# Patient Record
Sex: FEMALE | Race: WHITE | Employment: STUDENT
[De-identification: names, ages, dates, MRNs, and addresses within clinical notes are randomized per-mention and may not be internally consistent; named-entity substitution may affect disease eponyms.]

---

## 2017-06-03 ENCOUNTER — HEALTH MAINTENANCE LETTER (OUTPATIENT)
Age: 22
End: 2017-06-03

## 2021-10-05 ENCOUNTER — LAB (OUTPATIENT)
Dept: LAB | Facility: CLINIC | Age: 26
End: 2021-10-05
Attending: INTERNAL MEDICINE

## 2021-10-05 DIAGNOSIS — Z52.001 STEM CELL DONOR: Primary | ICD-10-CM

## 2021-10-05 DIAGNOSIS — Z52.001 STEM CELL DONOR: ICD-10-CM

## 2021-10-05 PROCEDURE — 81378 HLA I & II TYPING HR: CPT

## 2021-10-05 PROCEDURE — 36415 COLL VENOUS BLD VENIPUNCTURE: CPT

## 2021-10-05 PROCEDURE — 0001U RBC DNA HEA 35 AG 11 BLD GRP: CPT

## 2021-10-05 NOTE — NURSING NOTE
I drew labs on this pt while in clinic. They tolerated this well.   Labs sent down to the first floor labs.    Maria M Galarza MA

## 2021-10-08 ENCOUNTER — LAB (OUTPATIENT)
Dept: LAB | Facility: CLINIC | Age: 26
End: 2021-10-08
Attending: INTERNAL MEDICINE

## 2021-10-08 DIAGNOSIS — Z52.001 STEM CELL DONOR: ICD-10-CM

## 2021-10-08 DIAGNOSIS — Z52.001 STEM CELL DONOR: Primary | ICD-10-CM

## 2021-10-08 LAB
HBV SURFACE AB SERPL IA-ACNC: 0.42 M[IU]/ML
LAB DIRECTOR COMMENTS: NORMAL
LAB DIRECTOR DISCLAIMER: NORMAL
LAB DIRECTOR INTERPRETATION: NORMAL
LAB DIRECTOR METHODOLOGY: NORMAL
LAB DIRECTOR RESULTS: NORMAL
SPECIMEN DESCRIPTION: NORMAL
T PALLIDUM AB SER QL: NONREACTIVE

## 2021-10-08 PROCEDURE — 86706 HEP B SURFACE ANTIBODY: CPT

## 2021-10-08 PROCEDURE — 86780 TREPONEMA PALLIDUM: CPT

## 2021-10-08 PROCEDURE — 86644 CMV ANTIBODY: CPT

## 2021-10-08 PROCEDURE — 36415 COLL VENOUS BLD VENIPUNCTURE: CPT

## 2021-10-08 PROCEDURE — 86803 HEPATITIS C AB TEST: CPT

## 2021-10-08 PROCEDURE — 86665 EPSTEIN-BARR CAPSID VCA: CPT

## 2021-10-08 NOTE — NURSING NOTE
Chief Complaint   Patient presents with     Lab Only     labs drawn with  by rn.     Labs drawn with vpt by rn.  Pt tolerated well.     Caren Lagunas RN

## 2021-10-11 LAB
CMV IGG SERPL IA-ACNC: >10 U/ML
CMV IGG SERPL IA-ACNC: ABNORMAL
DONOR CYTOMEGALOVIRUS ABY: POSITIVE
DONOR HEP B CORE ABY: ABNORMAL
DONOR HEP B SURF AGN: ABNORMAL
DONOR HEPATITIS C ABY: ABNORMAL
DONOR HTLV 1&2 ANTIBODY: ABNORMAL
DONOR TREPONEMA PAL ABY: ABNORMAL
EBV VCA IGG SER IA-ACNC: <10 U/ML
EBV VCA IGG SER IA-ACNC: NORMAL
HIV1+2 AB SERPL QL IA: ABNORMAL
TRYPANOSOMA CRUZI: ABNORMAL

## 2021-10-13 DIAGNOSIS — Z86.2 PERSONAL HISTORY OF DISEASES OF BLOOD AND BLOOD-FORMING ORGANS: ICD-10-CM

## 2021-10-13 DIAGNOSIS — Z52.001 STEM CELL DONOR: ICD-10-CM

## 2021-10-14 ENCOUNTER — ONCOLOGY VISIT (OUTPATIENT)
Dept: TRANSPLANT | Facility: CLINIC | Age: 26
End: 2021-10-14
Attending: INTERNAL MEDICINE

## 2021-10-14 ENCOUNTER — HOSPITAL ENCOUNTER (OUTPATIENT)
Dept: LAB | Facility: CLINIC | Age: 26
End: 2021-10-14
Attending: INTERNAL MEDICINE

## 2021-10-14 ENCOUNTER — VIRTUAL VISIT (OUTPATIENT)
Dept: TRANSPLANT | Facility: CLINIC | Age: 26
End: 2021-10-14
Attending: INTERNAL MEDICINE

## 2021-10-14 ENCOUNTER — MEDICAL CORRESPONDENCE (OUTPATIENT)
Dept: TRANSPLANT | Facility: CLINIC | Age: 26
End: 2021-10-14

## 2021-10-14 ENCOUNTER — APPOINTMENT (OUTPATIENT)
Dept: LAB | Facility: CLINIC | Age: 26
End: 2021-10-14
Attending: INTERNAL MEDICINE

## 2021-10-14 ENCOUNTER — ANCILLARY PROCEDURE (OUTPATIENT)
Dept: GENERAL RADIOLOGY | Facility: CLINIC | Age: 26
End: 2021-10-14
Attending: INTERNAL MEDICINE

## 2021-10-14 ENCOUNTER — ALLIED HEALTH/NURSE VISIT (OUTPATIENT)
Dept: TRANSPLANT | Facility: CLINIC | Age: 26
End: 2021-10-14
Attending: INTERNAL MEDICINE

## 2021-10-14 VITALS
TEMPERATURE: 98.3 F | BODY MASS INDEX: 26.41 KG/M2 | DIASTOLIC BLOOD PRESSURE: 82 MMHG | WEIGHT: 154.7 LBS | OXYGEN SATURATION: 98 % | RESPIRATION RATE: 16 BRPM | HEART RATE: 81 BPM | SYSTOLIC BLOOD PRESSURE: 121 MMHG | HEIGHT: 64 IN

## 2021-10-14 VITALS
RESPIRATION RATE: 16 BRPM | TEMPERATURE: 98.8 F | DIASTOLIC BLOOD PRESSURE: 69 MMHG | HEART RATE: 86 BPM | HEIGHT: 64 IN | BODY MASS INDEX: 26.41 KG/M2 | WEIGHT: 154.7 LBS | SYSTOLIC BLOOD PRESSURE: 122 MMHG

## 2021-10-14 DIAGNOSIS — Z52.001 STEM CELL DONOR: ICD-10-CM

## 2021-10-14 DIAGNOSIS — Z86.2 PERSONAL HISTORY OF DISEASES OF BLOOD AND BLOOD-FORMING ORGANS: ICD-10-CM

## 2021-10-14 DIAGNOSIS — Z86.2 PERSONAL HISTORY OF DISEASES OF BLOOD AND BLOOD-FORMING ORGANS: Primary | ICD-10-CM

## 2021-10-14 DIAGNOSIS — Z52.001 STEM CELL DONOR: Primary | ICD-10-CM

## 2021-10-14 LAB
ABO/RH(D): NORMAL
ALBUMIN SERPL-MCNC: 4.3 G/DL (ref 3.4–5)
ALBUMIN UR-MCNC: NEGATIVE MG/DL
ALP SERPL-CCNC: 55 U/L (ref 40–150)
ALT SERPL W P-5'-P-CCNC: 19 U/L (ref 0–50)
ANION GAP SERPL CALCULATED.3IONS-SCNC: 6 MMOL/L (ref 3–14)
ANTIBODY SCREEN: NEGATIVE
APPEARANCE UR: ABNORMAL
APTT PPP: 32 SECONDS (ref 22–38)
AST SERPL W P-5'-P-CCNC: 13 U/L (ref 0–45)
BACTERIA #/AREA URNS HPF: ABNORMAL /HPF
BASOPHILS # BLD AUTO: 0 10E3/UL (ref 0–0.2)
BASOPHILS NFR BLD AUTO: 1 %
BILIRUB SERPL-MCNC: 1 MG/DL (ref 0.2–1.3)
BILIRUB UR QL STRIP: NEGATIVE
BUN SERPL-MCNC: 8 MG/DL (ref 7–30)
CALCIUM SERPL-MCNC: 9.5 MG/DL (ref 8.5–10.1)
CHLORIDE BLD-SCNC: 105 MMOL/L (ref 94–109)
CO2 SERPL-SCNC: 27 MMOL/L (ref 20–32)
COLOR UR AUTO: YELLOW
CREAT SERPL-MCNC: 0.92 MG/DL (ref 0.52–1.04)
EOSINOPHIL # BLD AUTO: 0.1 10E3/UL (ref 0–0.7)
EOSINOPHIL NFR BLD AUTO: 1 %
ERYTHROCYTE [DISTWIDTH] IN BLOOD BY AUTOMATED COUNT: 11.7 % (ref 10–15)
GFR SERPL CREATININE-BSD FRML MDRD: 86 ML/MIN/1.73M2
GLUCOSE BLD-MCNC: 101 MG/DL (ref 70–99)
GLUCOSE UR STRIP-MCNC: NEGATIVE MG/DL
HCG SERPL QL: NEGATIVE
HCT VFR BLD AUTO: 44 % (ref 35–47)
HGB BLD-MCNC: 15 G/DL (ref 11.7–15.7)
HGB UR QL STRIP: NEGATIVE
IMM GRANULOCYTES # BLD: 0 10E3/UL
IMM GRANULOCYTES NFR BLD: 0 %
INR PPP: 1.06 (ref 0.85–1.15)
KETONES UR STRIP-MCNC: NEGATIVE MG/DL
LEUKOCYTE ESTERASE UR QL STRIP: ABNORMAL
LYMPHOCYTES # BLD AUTO: 1.1 10E3/UL (ref 0.8–5.3)
LYMPHOCYTES NFR BLD AUTO: 13 %
MCH RBC QN AUTO: 29.5 PG (ref 26.5–33)
MCHC RBC AUTO-ENTMCNC: 34.1 G/DL (ref 31.5–36.5)
MCV RBC AUTO: 87 FL (ref 78–100)
MONOCYTES # BLD AUTO: 0.4 10E3/UL (ref 0–1.3)
MONOCYTES NFR BLD AUTO: 5 %
NEUTROPHILS # BLD AUTO: 6.7 10E3/UL (ref 1.6–8.3)
NEUTROPHILS NFR BLD AUTO: 80 %
NITRATE UR QL: NEGATIVE
NRBC # BLD AUTO: 0 10E3/UL
NRBC BLD AUTO-RTO: 0 /100
PH UR STRIP: 6 [PH] (ref 5–7)
PLATELET # BLD AUTO: 309 10E3/UL (ref 150–450)
POTASSIUM BLD-SCNC: 3.7 MMOL/L (ref 3.4–5.3)
PROT SERPL-MCNC: 7.8 G/DL (ref 6.8–8.8)
RBC # BLD AUTO: 5.08 10E6/UL (ref 3.8–5.2)
RBC URINE: 4 /HPF
SODIUM SERPL-SCNC: 138 MMOL/L (ref 133–144)
SP GR UR STRIP: 1 (ref 1–1.03)
SPECIMEN EXPIRATION DATE: NORMAL
SQUAMOUS EPITHELIAL: 6 /HPF
UROBILINOGEN UR STRIP-MCNC: NORMAL MG/DL
WBC # BLD AUTO: 8.3 10E3/UL (ref 4–11)
WBC URINE: 5 /HPF

## 2021-10-14 PROCEDURE — 99204 OFFICE O/P NEW MOD 45 MIN: CPT

## 2021-10-14 PROCEDURE — 85025 COMPLETE CBC W/AUTO DIFF WBC: CPT

## 2021-10-14 PROCEDURE — 36415 COLL VENOUS BLD VENIPUNCTURE: CPT

## 2021-10-14 PROCEDURE — 87086 URINE CULTURE/COLONY COUNT: CPT

## 2021-10-14 PROCEDURE — 82374 ASSAY BLOOD CARBON DIOXIDE: CPT

## 2021-10-14 PROCEDURE — 81001 URINALYSIS AUTO W/SCOPE: CPT

## 2021-10-14 PROCEDURE — 82040 ASSAY OF SERUM ALBUMIN: CPT

## 2021-10-14 PROCEDURE — 81382 HLA II TYPING 1 LOC HR: CPT

## 2021-10-14 PROCEDURE — 86900 BLOOD TYPING SEROLOGIC ABO: CPT

## 2021-10-14 PROCEDURE — 85610 PROTHROMBIN TIME: CPT

## 2021-10-14 PROCEDURE — 85730 THROMBOPLASTIN TIME PARTIAL: CPT

## 2021-10-14 PROCEDURE — G0463 HOSPITAL OUTPT CLINIC VISIT: HCPCS

## 2021-10-14 PROCEDURE — 81372 HLA I TYPING COMPLETE LR: CPT

## 2021-10-14 PROCEDURE — G0463 HOSPITAL OUTPT CLINIC VISIT: HCPCS | Mod: 25

## 2021-10-14 PROCEDURE — U0005 INFEC AGEN DETEC AMPLI PROBE: HCPCS

## 2021-10-14 PROCEDURE — 83021 HEMOGLOBIN CHROMOTOGRAPHY: CPT

## 2021-10-14 PROCEDURE — 81375 HLA II TYPING AG EQUIV LR: CPT

## 2021-10-14 PROCEDURE — 71046 X-RAY EXAM CHEST 2 VIEWS: CPT | Mod: GC | Performed by: STUDENT IN AN ORGANIZED HEALTH CARE EDUCATION/TRAINING PROGRAM

## 2021-10-14 PROCEDURE — 84703 CHORIONIC GONADOTROPIN ASSAY: CPT

## 2021-10-14 PROCEDURE — 93010 ELECTROCARDIOGRAM REPORT: CPT | Performed by: INTERNAL MEDICINE

## 2021-10-14 RX ORDER — ALBUTEROL SULFATE 90 UG/1
1 AEROSOL, METERED RESPIRATORY (INHALATION) DAILY PRN
COMMUNITY

## 2021-10-14 ASSESSMENT — MIFFLIN-ST. JEOR
SCORE: 1432.58
SCORE: 1432.58

## 2021-10-14 ASSESSMENT — PAIN SCALES - GENERAL: PAINLEVEL: NO PAIN (0)

## 2021-10-14 NOTE — LETTER
10/14/2021         RE: Roseline Wallace  [address]  [facility] [address]        Dear Colleague,    Thank you for referring your patient, Roseline Wallace, to the [facility] BLOOD AND MARROW TRANSPLANT PROGRAM [place]. Please see a copy of my visit note below.    Discussed GCSF and apheresis procedure for HSCT donation. Provided written handouts and reviewed donor consent, apheresis informational sheet, collection calender and side effects associated with GCSF and apheresis. Patient verbalized understanding. All questions were answered.         Again, thank you for allowing me to participate in the care of your patient.        Sincerely,        BMT Nurse Coordinator

## 2021-10-14 NOTE — PROGRESS NOTES
BMT Teaching Flowsheet    Roseline Wallace is a 26 year old female  Diagnoses of Stem cell donor and Personal history of diseases of blood and blood-forming organs were pertinent to this visit.    Teaching Topic: RD BMT workup     Person(s) involved in teaching: Patient  Motivation Level  Asks Questions: Yes  Eager to Learn: Yes  Cooperative: Yes  Receptive (willing/able to accept information): Yes  Any cultural factors/Evangelical beliefs that may influence understanding or compliance? No    Patient demonstrates understanding of the following:  - Reason for the appointment, diagnosis and treatment plan: Yes  - Knowledge of proper use of medications and conditions for which they are ordered (with special attention to potential side effects or drug interactions): Yes  - Which situations necessitate calling provider and whom to contact: Yes      Instructional Materials Used/Given:   BMT Calendar, BMT workup folder, consents    Donor arrived to clinic by herself. Vitals, Wt, and Ht obtained. Allergies and meds reviewed. BMT Calendar and workup folder given to patient and reviewed.  All questions answered. Consents signed. Labs drawn via venipuncture.  Covid swab and UA obtained. Patient left in stable condition for MA to do EKG.     Time spent with patient: 30 minutes.    Bell Contreras RN

## 2021-10-14 NOTE — NURSING NOTE
Chief Complaint   Patient presents with     RECHECK     H/P with Provider for RD BMT Workup     H/P with JUAN ANTONIO.    Bell Boles RN

## 2021-10-14 NOTE — NURSING NOTE
EKG was performed today per order written by Sophia.  Name and  verified with patient.    Genny Madden LPN

## 2021-10-14 NOTE — PROGRESS NOTES
Discussed GCSF and apheresis procedure for HSCT donation. Provided written handouts and reviewed donor consent, apheresis informational sheet, collection calender and side effects associated with GCSF and apheresis. Patient verbalized understanding. All questions were answered.

## 2021-10-14 NOTE — CONSULTS
"APHERESIS INITIAL CONSULT CHECKLIST    Current Encounter Information  Current Encounter Information: Reason for Visit, Allergies and Current Meds  Procedure Requested: MNC/PBSC Collection  History of: (Reason for Apheresis): Allo Donor    Access Assessment  Access Assessment  Vein Assessment:  Veins are adequate: Yes (LD/RB)  Needs a catheter placed for Apheresis?: No    Vital Signs  Vital Signs  BP: 122/69  Pulse: 86  Temp: 98.8  F (37.1  C)  Temp src: Oral  Resp: 16  Height: 163.5 cm (5' 4.37\")  Weight: 70.2 kg (154 lb 11.2 oz)    Reviewed   Review With Patient  Have you read the brochure Getting ready for Apheresis?: Yes  Have you had any invasive procedures, surgery, biopsy, bleeding in the last month?: No  Review medications and allergies: Yes (NKA)  Have you ever been transfused?: No  Patient given tour of the unit: No    Additional Information  Notes, needs and time spent with patient  Explain procedure, side effects or reactions, instructions: Yes  Patient has special need?: No  Time spent: 30 mins face to face time spent with donor/completed donor medical history & assessment. Discussed procedure & expectations. Hydration and low fat diet before and during the collection recommended.        "

## 2021-10-14 NOTE — CONSULTS
Transfusion Medicine Consultation    Roseline Wallace [id number]   YOB: 1995 Age: 26 year old   Date of Consult: 10/14/2021     Reason for consult: Allogeneic Hematopoietic Progenitor Cell (HPC)  Collection           Assessment and Plan:   26 year old female presents for consultation for allogeneic HPC collection for her sister with Hodgkin's Lymphoma.  The plan is to collect for 1 to 3 days or until the target goal is met. The patient does have adequate veins and will not require line placement. The procedure, risks/benefits were discussed with the patient and all of her questions were addressed at this time.         Chief Complaint:   Transfusion medicine consultation.         History of Present Illness:   26 year old female presents for consultation for allogeneic  HPC  collection.  Her past medical history includes spontaneous pneumothorax and asthma.  She is currently well. The patient denies any back pain that would prevent her from tolerating the procedure. She traveled to [place] in 2018 but no other travel to endemic [place] within the past year. The patient has no identifiable risk factors for infectious disease.               Past Medical History:     Spontaneous pneumothorax when she was 17   Asthma           Past Surgical History:     Lung surgery after her spontaneous pneumothorax            Social History:     Social History     Tobacco Use     Smoking status: Never Smoker     Smokeless tobacco: Never Used   Substance Use Topics     Alcohol use: Yes     Alcohol/week: 3.0 standard drinks     Types: 3 Glasses of wine per week             Family History:     Sister with Hodgkin's lymphoma           Immunizations:     Immunization History   Administered Date(s) Administered     COVID-19,PF,Moderna 04/12/2021, 05/10/2021          Allergies:     Seasonal allergies   Identical twin sister allergic to amoxicillin           Medications:     Current Outpatient Medications   Medication Sig      "albuterol (PROAIR HFA/PROVENTIL HFA/VENTOLIN HFA) 108 (90 Base) MCG/ACT inhaler Inhale 1 puff into the lungs daily as needed for shortness of breath / dyspnea or wheezing     No current facility-administered medications for this encounter.             Review of Systems:     CONSTITUTIONAL: NEGATIVE for fever, chills, change in weight  ENT/MOUTH: NEGATIVE for ear, mouth and throat problems  RESP: NEGATIVE for significant cough or SOB  CV: NEGATIVE for chest pain, palpitations or peripheral edema  GI: NEGATIVE for nausea, abdominal pain, heartburn, or change in bowel habits  MUSCULOSKELETAL: NEGATIVE for significant arthralgias or myalgia  NEURO: NEGATIVE for weakness, dizziness or paresthesias, hx of seizure, headache, numbness or tingling   HEME/ALLERGY/IMMUNE: NEGATIVE for bleeding problems           Vital Signs:   /69   Pulse 86   Temp 98.8  F (37.1  C) (Oral)   Resp 16   Ht 1.635 m (5' 4.37\")   Wt 70.2 kg (154 lb 11.2 oz)   BMI 26.25 kg/m              Data:     CBC RESULTS: Recent Labs   Lab Test 10/14/21  1226   WBC 8.3   RBC 5.08   HGB 15.0   HCT 44.0   MCV 87   MCH 29.5   MCHC 34.1   RDW 11.7          Wilfred (Hitesh) MD Nam, PhD  PGY-1 Pathology Resident   Pager: [phone]    ATTENDING ATTESTATION  I have personally seen and evaluated the patient with the Pathology Resident, Dr. Browning.  We have discussed the patient and I agree with the comments in his note above.      The patient is a 26 year old female with history of asthma and spontaneous pneumothorax.  She is an eligible and suitable donor. Consent was obtained for mononuclear cell collections.  Collection pending completion of donor evaluation and mobilization. Peripheral veins appear adequate for the collection.    Dixie Warren M.D., Ph.D.  Attending Physician  Division of Transfusion Medicine  Department of Laboratory [place] [address]                    "

## 2021-10-14 NOTE — LETTER
10/14/2021         RE: Roseline Wallace  [address]  [place] [address]        Dear Colleague,    Thank you for referring your patient, Roseline Wallace, to the [facility] BLOOD AND MARROW TRANSPLANT PROGRAM [place]. Please see a copy of my visit note below.    BMT Donor History and Physical    Roseline Wallace MRN# [id number]   Age: 26 year old YOB: 1995            Assessment and Plan   Healthy 25yo woman here for healthy related stem cell donor screening for her identical twin sister who has HD. She will be a syngeneic donor for her sister's auto PSBCT. EKG reviewed with JACK Rousseau : shows NSR with abnormal QRS angle. No concerns. VIrologies, labs, CXR reviewed and ok. Ok to proceed as donor with GCSF priming and apheresis.    Of note, her UA was cloudy, 5WBC, trace leuk esterase, moderate bacteria. No sx or hx UTI. Send UC.     HPI: Ms. Wallace is a 27 yo woman here for healthy related stem cell donor screening for her identical twin sister who has HD. She is feeling well and offers no complaints.    Transfusions: No  Hepatitis: No  Currently pregnant or any chance may be pregnant: No  Currently breast feeding: No  Currently using a method of birth control: abstinence  Number of previous pregnancies: none  Alcohol use: Yes, amount per week: 3  Tobacco use: No  Recreational drugs: No  Nonmedical percutaneous drug use: No  Recent immunizations or planning on getting any immunizations: No  Ear or body piercing in the last 12 months: No  New tattoo in recent 12 months:No  History of cancer or blood disease: No  Known risk factors: none         Past Medical History:   - spontaneous pneumothorax age 18 s/p chest tube (failed) and eventual pleurodesis  - asthma  - seasonal allergies         Past Surgical History:   - pleuradesis per above         Social History:   - single. Lives in [place] with sister. Taking online classes.         Family History:   - sister with Hodgkins  lymphoma  - dad with hypercholesterolemia  - MGM with DM  - 1 maternal aunt, 1 paternal aunt with breast cancer         Immunizations:   UTD including Covid. No flu shot yet this fall.         Allergies:   NKDA         Medications:     Current Outpatient Medications   Medication Sig     albuterol (PROAIR HFA/PROVENTIL HFA/VENTOLIN HFA) 108 (90 Base) MCG/ACT inhaler Inhale 1 puff into the lungs daily as needed for shortness of breath / dyspnea or wheezing     No current facility-administered medications for this visit.            Review of Systems:   CONSTITUTIONAL:  negative  EYES:  negative  HEENT:  negative  RESPIRATORY:  negative  CARDIOVASCULAR:  negative  GASTROINTESTINAL:  negative  GENITOURINARY:  negative  HEMATOLOGIC/LYMPHATIC:  negative  ALLERGIC/IMMUNOLOGIC:  negative  ENDOCRINE:  negative  MUSCULOSKELETAL:  negative  NEUROLOGICAL:  negative  BEHAVIOR/PSYCH:  negative         Physical Exam:   Vitals were reviewed                     Constitutional:   awake, alert, cooperative, no apparent distress, and appears stated age   Eyes:   Lids and lashes normal, pupils equal, round and reactive to light, extra ocular muscles intact, sclera clear, conjunctiva normal   ENT:   Normocephalic, without obvious abnormality, atraumatic, external ears without lesions, oral pharynx with moist mucous membranes, tonsils without erythema or exudates, gums normal and good dentition.   Neck:   Supple, symmetrical, trachea midline, no adenopathy, thyroid symmetric, not enlarged and no tenderness, skin normal   Hematologic / Lymphatic:   no cervical lymphadenopathy and no supraclavicular lymphadenopathy         Lungs:   No increased work of breathing, good air exchange, clear to auscultation bilaterally, no crackles or wheezing   Cardiovascular:   Normal apical impulse, regular rate and rhythm, normal S1 and S2, no S3 or S4, and no murmur noted   Abdomen:   normal bowel sounds, soft, non-distended, non-tender, no masses palpated,  no hepatosplenomegally               Neurologic:   Non-focal   Neuropsychiatric:   General: normal, calm and normal eye contact   Skin:   no rashes            Data:   All laboratory data reviewed  Lab Results   Component Value Date    WBC 8.3 10/14/2021    HGB 15.0 10/14/2021    HCT 44.0 10/14/2021     10/14/2021     10/14/2021    POTASSIUM 3.7 10/14/2021    CHLORIDE 105 10/14/2021    CO2 27 10/14/2021    BUN 8 10/14/2021    CR 0.92 10/14/2021     (H) 10/14/2021    AST 13 10/14/2021    ALT 19 10/14/2021    ALKPHOS 55 10/14/2021    BILITOTAL 1.0 10/14/2021    INR 1.06 10/14/2021     EKG results:   Performed today  Prelim: Sinus rhythm with sinus arrhythmia   Abnormal QRS-T angle, consider primary T wave abnormality              CXR no focal aso     (x )The  donor protocol, risks, and benefits,  and consent form were reviewed with the patient, and all questions were answered before the consent was signed. A copy of the consent was provided to the patient. The labs were reviewed, imaging and EKG were reviewed if applicable. Health history and physical exam completed. Infectious disease testing is pending and must be reviewed before the patient meets criteria to be a donor.    Patient signed BMT research consent form: The BMT research consent form, including the purpose of the study, details of the consent form, risks, and benefits, was reviewed with Roseline Wallace, and all questions were answered before she signed the consent form.     Leah Garcia PA-C                 Again, thank you for allowing me to participate in the care of your patient.        Sincerely,        BMT Advanced Practice Provider

## 2021-10-14 NOTE — PROGRESS NOTES
BMT Donor History and Physical    Roseline Wallace MRN# [id number]   Age: 26 year old YOB: 1995            Assessment and Plan   Healthy 27yo woman here for healthy related stem cell donor screening for her identical twin sister who has HD. She will be a syngeneic donor for her sister's auto PSBCT. EKG reviewed with JACK Rousseau : shows NSR with abnormal QRS angle. No concerns. VIrologies, labs, CXR reviewed and ok. Ok to proceed as donor with GCSF priming and apheresis.    Of note, her UA was cloudy, 5WBC, trace leuk esterase, moderate bacteria. No sx or hx UTI. Send UC.     HPI: Ms. Wallace is a 25 yo woman here for healthy related stem cell donor screening for her identical twin sister who has HD. She is feeling well and offers no complaints.    Transfusions: No  Hepatitis: No  Currently pregnant or any chance may be pregnant: No  Currently breast feeding: No  Currently using a method of birth control: abstinence  Number of previous pregnancies: none  Alcohol use: Yes, amount per week: 3  Tobacco use: No  Recreational drugs: No  Nonmedical percutaneous drug use: No  Recent immunizations or planning on getting any immunizations: No  Ear or body piercing in the last 12 months: No  New tattoo in recent 12 months:No  History of cancer or blood disease: No  Known risk factors: none         Past Medical History:   - spontaneous pneumothorax age 18 s/p chest tube (failed) and eventual pleurodesis  - asthma  - seasonal allergies         Past Surgical History:   - pleuradesis per above         Social History:   - single. Lives in [place] with sister. Taking online classes.         Family History:   - sister with Hodgkins lymphoma  - dad with hypercholesterolemia  - MGM with DM  - 1 maternal aunt, 1 paternal aunt with breast cancer         Immunizations:   UTD including Covid. No flu shot yet this fall.         Allergies:   NKDA         Medications:     Current Outpatient Medications   Medication Sig      albuterol (PROAIR HFA/PROVENTIL HFA/VENTOLIN HFA) 108 (90 Base) MCG/ACT inhaler Inhale 1 puff into the lungs daily as needed for shortness of breath / dyspnea or wheezing     No current facility-administered medications for this visit.            Review of Systems:   CONSTITUTIONAL:  negative  EYES:  negative  HEENT:  negative  RESPIRATORY:  negative  CARDIOVASCULAR:  negative  GASTROINTESTINAL:  negative  GENITOURINARY:  negative  HEMATOLOGIC/LYMPHATIC:  negative  ALLERGIC/IMMUNOLOGIC:  negative  ENDOCRINE:  negative  MUSCULOSKELETAL:  negative  NEUROLOGICAL:  negative  BEHAVIOR/PSYCH:  negative         Physical Exam:   Vitals were reviewed                     Constitutional:   awake, alert, cooperative, no apparent distress, and appears stated age   Eyes:   Lids and lashes normal, pupils equal, round and reactive to light, extra ocular muscles intact, sclera clear, conjunctiva normal   ENT:   Normocephalic, without obvious abnormality, atraumatic, external ears without lesions, oral pharynx with moist mucous membranes, tonsils without erythema or exudates, gums normal and good dentition.   Neck:   Supple, symmetrical, trachea midline, no adenopathy, thyroid symmetric, not enlarged and no tenderness, skin normal   Hematologic / Lymphatic:   no cervical lymphadenopathy and no supraclavicular lymphadenopathy         Lungs:   No increased work of breathing, good air exchange, clear to auscultation bilaterally, no crackles or wheezing   Cardiovascular:   Normal apical impulse, regular rate and rhythm, normal S1 and S2, no S3 or S4, and no murmur noted   Abdomen:   normal bowel sounds, soft, non-distended, non-tender, no masses palpated, no hepatosplenomegally               Neurologic:   Non-focal   Neuropsychiatric:   General: normal, calm and normal eye contact   Skin:   no rashes            Data:   All laboratory data reviewed  Lab Results   Component Value Date    WBC 8.3 10/14/2021    HGB 15.0 10/14/2021    HCT  44.0 10/14/2021     10/14/2021     10/14/2021    POTASSIUM 3.7 10/14/2021    CHLORIDE 105 10/14/2021    CO2 27 10/14/2021    BUN 8 10/14/2021    CR 0.92 10/14/2021     (H) 10/14/2021    AST 13 10/14/2021    ALT 19 10/14/2021    ALKPHOS 55 10/14/2021    BILITOTAL 1.0 10/14/2021    INR 1.06 10/14/2021     EKG results:   Performed today  Prelim: Sinus rhythm with sinus arrhythmia   Abnormal QRS-T angle, consider primary T wave abnormality              CXR no focal aso     (x )The  donor protocol, risks, and benefits,  and consent form were reviewed with the patient, and all questions were answered before the consent was signed. A copy of the consent was provided to the patient. The labs were reviewed, imaging and EKG were reviewed if applicable. Health history and physical exam completed. Infectious disease testing is pending and must be reviewed before the patient meets criteria to be a donor.    Patient signed BMT research consent form: The BMT research consent form, including the purpose of the study, details of the consent form, risks, and benefits, was reviewed with Roseline Wallace, and all questions were answered before she signed the consent form.     Leah Garcia PA-C

## 2021-10-14 NOTE — NURSING NOTE
"Oncology Rooming Note    October 14, 2021 12:11 PM   Roseline Wallace is a 26 year old female who presents for:    Chief Complaint   Patient presents with     Nurse Visit     RD BMT Workup     Initial Vitals: /82 (BP Location: Right arm)   Pulse 81   Temp 98.3  F (36.8  C) (Oral)   Resp 16   Ht 1.635 m (5' 4.37\")   Wt 70.2 kg (154 lb 11.2 oz)   SpO2 98%   BMI 26.25 kg/m   Estimated body mass index is 26.25 kg/m  as calculated from the following:    Height as of this encounter: 1.635 m (5' 4.37\").    Weight as of this encounter: 70.2 kg (154 lb 11.2 oz). Body surface area is 1.79 meters squared.  No Pain (0) Comment: Data Unavailable   No LMP recorded.  Allergies reviewed: Yes  Medications reviewed: Yes    Medications: Medication refills not needed today.  Pharmacy name entered into EPIC: Data Unavailable    Clinical concerns: VSS. No clinical concerns.     Bell Contreras RN              "

## 2021-10-15 ENCOUNTER — TELEPHONE (OUTPATIENT)
Dept: TRANSPLANT | Facility: CLINIC | Age: 26
End: 2021-10-15

## 2021-10-15 DIAGNOSIS — Z52.001 STEM CELL DONOR: ICD-10-CM

## 2021-10-15 LAB
ATRIAL RATE - MUSE: 75 BPM
BACTERIA UR CULT: NORMAL
DIASTOLIC BLOOD PRESSURE - MUSE: NORMAL MMHG
INTERPRETATION ECG - MUSE: NORMAL
P AXIS - MUSE: 39 DEGREES
PR INTERVAL - MUSE: 154 MS
QRS DURATION - MUSE: 78 MS
QT - MUSE: 376 MS
QTC - MUSE: 419 MS
R AXIS - MUSE: 78 DEGREES
SARS-COV-2 RNA RESP QL NAA+PROBE: NEGATIVE
SYSTOLIC BLOOD PRESSURE - MUSE: NORMAL MMHG
T AXIS - MUSE: -6 DEGREES
VENTRICULAR RATE- MUSE: 75 BPM

## 2021-10-15 NOTE — TELEPHONE ENCOUNTER
Writer called and confirmed appointment dates and times with Roseline for upcoming PBSC donation.  Schedule of appointments was also emailed to [email or web address] per request.  She will begin GCSF on Saturday 10/16/21.  She understands dates, times procedures and instructions provided and no further questions at this time.    She has COVID test and pregnancy test on 10/14/21 and so will not need to repeat these tests prior to G administration.

## 2021-10-16 ENCOUNTER — ONCOLOGY VISIT (OUTPATIENT)
Dept: TRANSPLANT | Facility: CLINIC | Age: 26
End: 2021-10-16
Attending: INTERNAL MEDICINE
Payer: COMMERCIAL

## 2021-10-16 VITALS
DIASTOLIC BLOOD PRESSURE: 75 MMHG | TEMPERATURE: 99 F | SYSTOLIC BLOOD PRESSURE: 114 MMHG | RESPIRATION RATE: 18 BRPM | OXYGEN SATURATION: 98 % | HEART RATE: 79 BPM

## 2021-10-16 DIAGNOSIS — Z52.001 STEM CELL DONOR: Primary | ICD-10-CM

## 2021-10-16 LAB — HGB S BLD QL: NEGATIVE

## 2021-10-16 PROCEDURE — 99212 OFFICE O/P EST SF 10 MIN: CPT

## 2021-10-16 PROCEDURE — 250N000011 HC RX IP 250 OP 636: Performed by: INTERNAL MEDICINE

## 2021-10-16 PROCEDURE — 96372 THER/PROPH/DIAG INJ SC/IM: CPT | Performed by: INTERNAL MEDICINE

## 2021-10-16 RX ADMIN — FILGRASTIM 780 MCG: 480 INJECTION, SOLUTION INTRAVENOUS; SUBCUTANEOUS at 09:42

## 2021-10-16 ASSESSMENT — PAIN SCALES - GENERAL: PAINLEVEL: NO PAIN (0)

## 2021-10-16 NOTE — LETTER
10/16/2021         RE: Roseline Wallace  [address]  [place] [address]        Dear Colleague,    Thank you for referring your patient, Roseline Wallace, to the [facility] BLOOD AND MARROW TRANSPLANT PROGRAM [place]. Please see a copy of my visit note below.    BMT/Cell Therapy Daily Progress Note   10/16/2021    Patient ID:  Roseline Wallace is a 26 year old female, here for G-CSF mobilization as a syngeneic domor.      INTERVAL  HISTORY     Roseline is in good health.  No fevers, chills, N/V/D, dyspnea, headache, or any other symptoms.    Review of Systems: 10 point ROS negative except as noted above.      PHYSICAL EXAM     Weight In/Out     Wt Readings from Last 3 Encounters:   10/14/21 70.2 kg (154 lb 11.2 oz)   10/14/21 70.2 kg (154 lb 11.2 oz)             KPS:  100    /75   Pulse 79   Temp 99  F (37.2  C)   Resp 18   SpO2 98%      General: NAD   Eyes: : VICKY, sclera anicteric   Nose/Mouth/Throat: OP clear, buccal mucosa moist, no ulcerations   Lungs: No wheezing  Cardiovascular: No JVD  Abdominal/Rectal: non-distended  Lymphatics: no edema  Skin: no rashes or petechiae  Neuro: A&O   Musculoskeletal: muscle mass normal        LABS AND IMAGING: I have assessed all abnormal lab values for their clinical significance and any values considered clinically significant have been addressed in the assessment and plan.        Lab Results   Component Value Date    WBC 8.3 10/14/2021    HGB 15.0 10/14/2021    HCT 44.0 10/14/2021     10/14/2021     10/14/2021    POTASSIUM 3.7 10/14/2021    CHLORIDE 105 10/14/2021    CO2 27 10/14/2021     (H) 10/14/2021    BUN 8 10/14/2021    CR 0.92 10/14/2021    INR 1.06 10/14/2021       SYSTEMS-BASED ASSESSMENT AND PLAN       1. Healthy syngeneic donor    Proceed to G-CSF mobilization today    Known issues that I take into account for medical decisions, with salient changes to the plan considering these complexities noted  above.    Patient Active Problem List   Diagnosis     Stem cell donor       I spent 15 minutes in the care of this patient today, which included time necessary for preparation for the visit, obtaining history, ordering medications/tests/procedures as medically indicated, review of pertinent medical literature, counseling of the patient, communication of recommendations to the care team, and documentation time.    Roxy Rousseua    Securely message with the Vocera Web Console           Again, thank you for allowing me to participate in the care of your patient.        Sincerely,        BMT DOM

## 2021-10-16 NOTE — PROGRESS NOTES
BMT/Cell Therapy Daily Progress Note   10/16/2021    Patient ID:  Roseline Wallace is a 26 year old female, here for G-CSF mobilization as a syngeneic domor.      INTERVAL  HISTORY     Roseline is in good health.  No fevers, chills, N/V/D, dyspnea, headache, or any other symptoms.    Review of Systems: 10 point ROS negative except as noted above.      PHYSICAL EXAM     Weight In/Out     Wt Readings from Last 3 Encounters:   10/14/21 70.2 kg (154 lb 11.2 oz)   10/14/21 70.2 kg (154 lb 11.2 oz)             KPS:  100    /75   Pulse 79   Temp 99  F (37.2  C)   Resp 18   SpO2 98%      General: NAD   Eyes: : VICKY, sclera anicteric   Nose/Mouth/Throat: OP clear, buccal mucosa moist, no ulcerations   Lungs: No wheezing  Cardiovascular: No JVD  Abdominal/Rectal: non-distended  Lymphatics: no edema  Skin: no rashes or petechiae  Neuro: A&O   Musculoskeletal: muscle mass normal        LABS AND IMAGING: I have assessed all abnormal lab values for their clinical significance and any values considered clinically significant have been addressed in the assessment and plan.        Lab Results   Component Value Date    WBC 8.3 10/14/2021    HGB 15.0 10/14/2021    HCT 44.0 10/14/2021     10/14/2021     10/14/2021    POTASSIUM 3.7 10/14/2021    CHLORIDE 105 10/14/2021    CO2 27 10/14/2021     (H) 10/14/2021    BUN 8 10/14/2021    CR 0.92 10/14/2021    INR 1.06 10/14/2021       SYSTEMS-BASED ASSESSMENT AND PLAN       1. Healthy syngeneic donor    Proceed to G-CSF mobilization today    Known issues that I take into account for medical decisions, with salient changes to the plan considering these complexities noted above.    Patient Active Problem List   Diagnosis     Stem cell donor       I spent 15 minutes in the care of this patient today, which included time necessary for preparation for the visit, obtaining history, ordering medications/tests/procedures as medically indicated, review of pertinent  medical literature, counseling of the patient, communication of recommendations to the care team, and documentation time.    Roxy Rousseau    Securely message with the Vocera Web Console

## 2021-10-16 NOTE — NURSING NOTE
Chief Complaint   Patient presents with     RECHECK     related donor here for GCSF inj     Tolerated inj well.  Aware of future appointments,     Suzi Payan RN on 10/16/2021 at 10:00 AM

## 2021-10-17 ENCOUNTER — ALLIED HEALTH/NURSE VISIT (OUTPATIENT)
Dept: TRANSPLANT | Facility: CLINIC | Age: 26
End: 2021-10-17
Attending: INTERNAL MEDICINE

## 2021-10-17 DIAGNOSIS — Z52.001 STEM CELL DONOR: Primary | ICD-10-CM

## 2021-10-17 PROCEDURE — 250N000011 HC RX IP 250 OP 636: Performed by: INTERNAL MEDICINE

## 2021-10-17 PROCEDURE — 96372 THER/PROPH/DIAG INJ SC/IM: CPT | Performed by: INTERNAL MEDICINE

## 2021-10-17 RX ADMIN — FILGRASTIM 780 MCG: 480 INJECTION, SOLUTION INTRAVENOUS; SUBCUTANEOUS at 09:28

## 2021-10-17 NOTE — PROGRESS NOTES
Chief Complaint   Patient presents with     RECHECK     related donor here for GCSF inj     Tolerating inj well  Aware of future visits  Suzi Payan RN on 10/17/2021 at 9:50 AM

## 2021-10-18 ENCOUNTER — ALLIED HEALTH/NURSE VISIT (OUTPATIENT)
Dept: TRANSPLANT | Facility: CLINIC | Age: 26
End: 2021-10-18
Attending: INTERNAL MEDICINE
Payer: COMMERCIAL

## 2021-10-18 DIAGNOSIS — Z52.001 STEM CELL DONOR: Primary | ICD-10-CM

## 2021-10-18 LAB
A*: NORMAL
A*LOCUS SEROLOGIC EQUIVALENT: 24
A*LOCUS: NORMAL
A*SEROLOGIC EQUIVALENT: 68
ABSSPTEST METHOD: NORMAL
ABTEST METHOD: NORMAL
B*LOCUS SEROLOGIC EQUIVALENT: 8
B*LOCUS: NORMAL
BW-1: NORMAL
C*LOCUS SEROLOGIC EQUIVALENT: 7
C*LOCUS: NORMAL
DPA1*: NORMAL
DPA1*LOCUS: NORMAL
DPB1*: NORMAL
DPB1*LOCUS: NORMAL
DQA1*LOCUS: NORMAL
DQB1*LOCUS SEROLOGIC EQUIVALENT: 2
DQB1*LOCUS: NORMAL
DRB1*LOCUS SEROLOGIC EQUIVALENT: 17
DRB1*LOCUS: NORMAL
DRB3*LOCUS SEROLOGIC EQUIVALENT: 52
DRB3*LOCUS: NORMAL
DRSSO TEST METHOD: NORMAL
SCANNED LAB RESULT: NORMAL
SSPA* LOCUS: NORMAL
SSPA*: NORMAL
SSPB* LOCUS: NORMAL
SSPBW-1: NORMAL
SSPC* LOCUS: NORMAL
ZZZABSSP COMMENTS: NORMAL

## 2021-10-18 PROCEDURE — 96372 THER/PROPH/DIAG INJ SC/IM: CPT | Performed by: INTERNAL MEDICINE

## 2021-10-18 PROCEDURE — 250N000011 HC RX IP 250 OP 636: Performed by: INTERNAL MEDICINE

## 2021-10-18 RX ADMIN — FILGRASTIM 780 MCG: 480 INJECTION, SOLUTION INTRAVENOUS; SUBCUTANEOUS at 08:35

## 2021-10-18 NOTE — NURSING NOTE
780 mcg of Neupogen was administered subcutaneous in Right Abdomen . Patient tolerated injection with no incident. Please see MAR for additional details.     Nirali Carrasco LPN October 18, 2021 8:44 AM

## 2021-10-19 ENCOUNTER — ALLIED HEALTH/NURSE VISIT (OUTPATIENT)
Dept: TRANSPLANT | Facility: CLINIC | Age: 26
End: 2021-10-19
Attending: INTERNAL MEDICINE

## 2021-10-19 DIAGNOSIS — Z52.001 STEM CELL DONOR: Primary | ICD-10-CM

## 2021-10-19 LAB
BKR CONFIRMATION TYPING RECPIENT: NORMAL
DRSSPDPA1*: NORMAL
DRSSPDPA1*LOCUS: NORMAL
DRSSPDPB1*2 NMDP: NORMAL
DRSSPDPB1*2: NORMAL
DRSSPDPB1*LOCUS: NORMAL
DRSSPDPB1*LOCUSNMDP: NORMAL
DRSSPTEST METHOD: NORMAL
SSPDQA1*LOCUS: NORMAL
SSPDQB1*LOCUS: NORMAL
SSPDRB1* LOCUS: NORMAL
SSPDRB3* LOCUS: NORMAL
SSPTEST METHOD: NORMAL
ZZZDRSSP COMMENTS: NORMAL
ZZZSSP COMMENTS: NORMAL

## 2021-10-19 PROCEDURE — 250N000011 HC RX IP 250 OP 636: Performed by: INTERNAL MEDICINE

## 2021-10-19 PROCEDURE — 96372 THER/PROPH/DIAG INJ SC/IM: CPT | Performed by: INTERNAL MEDICINE

## 2021-10-19 RX ADMIN — FILGRASTIM 780 MCG: 480 INJECTION, SOLUTION INTRAVENOUS; SUBCUTANEOUS at 08:05

## 2021-10-19 NOTE — NURSING NOTE
Neupogen given in LLQ in clinic, pt tolerated. See MAR for details  Nereyda Mason, DARBY on 10/19/2021 at 8:15 AM

## 2021-10-20 ENCOUNTER — HOSPITAL ENCOUNTER (OUTPATIENT)
Dept: LAB | Facility: CLINIC | Age: 26
End: 2021-10-20
Attending: INTERNAL MEDICINE

## 2021-10-20 ENCOUNTER — ONCOLOGY VISIT (OUTPATIENT)
Dept: TRANSPLANT | Facility: CLINIC | Age: 26
End: 2021-10-20
Attending: STUDENT IN AN ORGANIZED HEALTH CARE EDUCATION/TRAINING PROGRAM
Payer: COMMERCIAL

## 2021-10-20 VITALS
DIASTOLIC BLOOD PRESSURE: 70 MMHG | SYSTOLIC BLOOD PRESSURE: 132 MMHG | TEMPERATURE: 99.4 F | HEART RATE: 104 BPM | RESPIRATION RATE: 16 BRPM | BODY MASS INDEX: 26.04 KG/M2 | WEIGHT: 153.44 LBS

## 2021-10-20 DIAGNOSIS — Z52.001 STEM CELL DONOR: Primary | ICD-10-CM

## 2021-10-20 DIAGNOSIS — Z52.001 STEM CELL DONOR: ICD-10-CM

## 2021-10-20 LAB
ABO/RH(D): NORMAL
ANTIBODY SCREEN: NEGATIVE
BASOPHILS # BLD MANUAL: 0 10E3/UL (ref 0–0.2)
BASOPHILS # BLD MANUAL: 0 10E3/UL (ref 0–0.2)
BASOPHILS NFR BLD MANUAL: 0 %
BASOPHILS NFR BLD MANUAL: 0 %
CD34 ABSOLUTE COUNT COMMENT: NORMAL
CD34 CELLS # SPEC: 71 CELLS/UL
CD34 CELLS NFR SPEC: 0.22 %
EOSINOPHIL # BLD MANUAL: 0.4 10E3/UL (ref 0–0.7)
EOSINOPHIL # BLD MANUAL: 0.6 10E3/UL (ref 0–0.7)
EOSINOPHIL NFR BLD MANUAL: 1 %
EOSINOPHIL NFR BLD MANUAL: 2 %
ERYTHROCYTE [DISTWIDTH] IN BLOOD BY AUTOMATED COUNT: 12.1 % (ref 10–15)
ERYTHROCYTE [DISTWIDTH] IN BLOOD BY AUTOMATED COUNT: 12.2 % (ref 10–15)
HCT VFR BLD AUTO: 40.2 % (ref 35–47)
HCT VFR BLD AUTO: 42.3 % (ref 35–47)
HGB BLD-MCNC: 13.5 G/DL (ref 11.7–15.7)
HGB BLD-MCNC: 14.2 G/DL (ref 11.7–15.7)
LYMPHOCYTES # BLD MANUAL: 2.6 10E3/UL (ref 0.8–5.3)
LYMPHOCYTES # BLD MANUAL: 3 10E3/UL (ref 0.8–5.3)
LYMPHOCYTES NFR BLD MANUAL: 8 %
LYMPHOCYTES NFR BLD MANUAL: 8 %
MCH RBC QN AUTO: 29.4 PG (ref 26.5–33)
MCH RBC QN AUTO: 29.4 PG (ref 26.5–33)
MCHC RBC AUTO-ENTMCNC: 33.6 G/DL (ref 31.5–36.5)
MCHC RBC AUTO-ENTMCNC: 33.6 G/DL (ref 31.5–36.5)
MCV RBC AUTO: 88 FL (ref 78–100)
MCV RBC AUTO: 88 FL (ref 78–100)
METAMYELOCYTES # BLD MANUAL: 0.3 10E3/UL
METAMYELOCYTES # BLD MANUAL: 0.4 10E3/UL
METAMYELOCYTES NFR BLD MANUAL: 1 %
METAMYELOCYTES NFR BLD MANUAL: 1 %
MONOCYTES # BLD MANUAL: 1 10E3/UL (ref 0–1.3)
MONOCYTES # BLD MANUAL: 1.1 10E3/UL (ref 0–1.3)
MONOCYTES NFR BLD MANUAL: 3 %
MONOCYTES NFR BLD MANUAL: 3 %
MYELOCYTES # BLD MANUAL: 0.4 10E3/UL
MYELOCYTES # BLD MANUAL: 0.6 10E3/UL
MYELOCYTES NFR BLD MANUAL: 1 %
MYELOCYTES NFR BLD MANUAL: 2 %
NEUTROPHILS # BLD MANUAL: 26.9 10E3/UL (ref 1.6–8.3)
NEUTROPHILS # BLD MANUAL: 32.3 10E3/UL (ref 1.6–8.3)
NEUTROPHILS NFR BLD MANUAL: 84 %
NEUTROPHILS NFR BLD MANUAL: 86 %
PLAT MORPH BLD: ABNORMAL
PLAT MORPH BLD: ABNORMAL
PLATELET # BLD AUTO: 253 10E3/UL (ref 150–450)
PLATELET # BLD AUTO: 292 10E3/UL (ref 150–450)
PRODUCT NUMBER FLOW CYTOMETRY: NORMAL
RBC # BLD AUTO: 4.59 10E6/UL (ref 3.8–5.2)
RBC # BLD AUTO: 4.83 10E6/UL (ref 3.8–5.2)
RBC MORPH BLD: ABNORMAL
RBC MORPH BLD: ABNORMAL
SPECIMEN EXPIRATION DATE: NORMAL
VIABLE CD34 CELLS NFR FLD: 98.55 %
WBC # BLD AUTO: 32 10E3/UL (ref 4–11)
WBC # BLD AUTO: 37.6 10E3/UL (ref 4–11)

## 2021-10-20 PROCEDURE — 999N000128 HC STATISTIC PERIPHERAL IV START W/O US GUIDANCE

## 2021-10-20 PROCEDURE — 87516 HEPATITIS B DNA AMP PROBE: CPT | Performed by: PATHOLOGY

## 2021-10-20 PROCEDURE — 250N000011 HC RX IP 250 OP 636: Performed by: PATHOLOGY

## 2021-10-20 PROCEDURE — 86900 BLOOD TYPING SEROLOGIC ABO: CPT | Performed by: PATHOLOGY

## 2021-10-20 PROCEDURE — 36415 COLL VENOUS BLD VENIPUNCTURE: CPT | Performed by: PATHOLOGY

## 2021-10-20 PROCEDURE — 99213 OFFICE O/P EST LOW 20 MIN: CPT

## 2021-10-20 PROCEDURE — 85027 COMPLETE CBC AUTOMATED: CPT | Performed by: PATHOLOGY

## 2021-10-20 PROCEDURE — 250N000011 HC RX IP 250 OP 636: Performed by: INTERNAL MEDICINE

## 2021-10-20 PROCEDURE — 86367 STEM CELLS TOTAL COUNT: CPT | Performed by: PATHOLOGY

## 2021-10-20 PROCEDURE — 38205 HARVEST ALLOGENEIC STEM CELL: CPT

## 2021-10-20 PROCEDURE — 96372 THER/PROPH/DIAG INJ SC/IM: CPT | Mod: XS | Performed by: INTERNAL MEDICINE

## 2021-10-20 PROCEDURE — 250N000009 HC RX 250: Performed by: PATHOLOGY

## 2021-10-20 RX ORDER — HEPARIN SODIUM (PORCINE) LOCK FLUSH IV SOLN 100 UNIT/ML 100 UNIT/ML
1 SOLUTION INTRAVENOUS
Status: COMPLETED | OUTPATIENT
Start: 2021-10-20 | End: 2021-10-20

## 2021-10-20 RX ADMIN — FILGRASTIM 780 MCG: 480 INJECTION, SOLUTION INTRAVENOUS; SUBCUTANEOUS at 08:49

## 2021-10-20 RX ADMIN — CALCIUM GLUCONATE 1404 MG/HR: 94 INJECTION, SOLUTION INTRAVENOUS at 09:15

## 2021-10-20 RX ADMIN — ANTICOAGULANT CITRATE DEXTROSE SOLUTION FORMULA A 1340 ML: 12.25; 11; 3.65 SOLUTION INTRAVENOUS at 09:14

## 2021-10-20 RX ADMIN — Medication 1 ML: at 14:45

## 2021-10-20 NOTE — PROCEDURES
Laboratory Medicine and Pathology  Transfusion Medicine - Apheresis Procedure Note    Roseline Wallace MRN# [id number]   YOB: 1995 Age: 26 year old   Date of Procedure: 10/20/2021    Procedure:  PBSC Collection  Reason for Procedure: Allogeneic HPC collection for   her identical twin sister with Hodgkin's Lymphoma.                      Assessment and Plan:   Roseline Wallace is a 26 year old female presents for her 1st  allogeneic HPC collection for her identical twin  sister with Hodgkin's Lymphoma.  The patient tolerated today's collection well and she is on our schedule for tomorrow if needed. .    Attestation:   This patient has been seen and evaluated by me, Earl Araiza.              History of Present Illness   Roseline Wallace is a 26 year old female with an h/o spontaneous pneumothorax and asthma, but who is otherwise  Healthy,  and presents for her first  allogeneic  HPC  collection for her identical twin sister with Hodgkin's Disease. She will be a syngeneic donor for her sister's PSBCT. EKG reportedly  shows NSR with abnormal QRS angle but No concerns and the same for her virologies, labs, and CXR.         Past Medical History:   No past medical history on file.          Past Surgical History:   No past surgical history on file.           Social History:     Social History     Tobacco Use     Smoking status: Never Smoker     Smokeless tobacco: Never Used   Substance Use Topics     Alcohol use: Yes     Alcohol/week: 3.0 standard drinks     Types: 3 Glasses of wine per week            Allergies:   No Known Allergies          Medications:     Current Outpatient Medications   Medication Sig Dispense Refill     albuterol (PROAIR HFA/PROVENTIL HFA/VENTOLIN HFA) 108 (90 Base) MCG/ACT inhaler Inhale 1 puff into the lungs daily as needed for shortness of breath / dyspnea or wheezing                 Abbreviated Physical Exam:   /70   Pulse 104   Temp 99.4  F (37.4  C) (Oral)    Resp 16   Wt 69.6 kg (153 lb 7 oz)   BMI 26.04 kg/m    Patient Alert & Oriented and in No Acute Distress         Laboratory Data:     BMP  Recent Labs   Lab 10/14/21  1226      POTASSIUM 3.7   CHLORIDE 105   KALPESH 9.5   CO2 27   BUN 8   CR 0.92   *     CBC  Recent Labs   Lab 10/20/21  1415 10/20/21  0905 10/20/21  0905 10/14/21  1226 10/14/21  1226   WBC 37.6*  --  32.0*  --  8.3   RBC 4.59  --  4.83  --  5.08   HGB 13.5   < > 14.2   < > 15.0   HCT 40.2  --  42.3  --  44.0   MCV 88  --  88  --  87   MCH 29.4  --  29.4  --  29.5   MCHC 33.6  --  33.6  --  34.1   RDW 12.2  --  12.1  --  11.7     --  292  --  309    < > = values in this interval not displayed.     INR  Recent Labs   Lab 10/14/21  1226   INR 1.06       CD34  Results for SALVADOR BURGESS (MRN [id number]) as of 10/20/2021 17:19   Ref. Range 10/20/2021 09:05   CD34 Absolute count Latest Units: cells/uL 71            Procedure Summary:   An ~ 5 hour PBSC collection was  performed. Peripheral veins  were used for access. ACD-A was used  for anticoagulation. To offset the effects of the citrate, calcium gluconate was given in the return line. The patient's vital signs were stable and she  tolerated the procedure well.     Attestation:   This patient has been seen and evaluated by me, Earl Araiza.  Earl Araiza MD   Division of Transfusion Medicine   Department [place] [address]   Pager: [phone] or [phone]

## 2021-10-20 NOTE — DISCHARGE INSTRUCTIONS
Apheresis Blood Donor Center Post Instructions  You may feel tired after your procedure today.   Please call your doctor if you have:  bleeding that doesn t stop, fever, pain where a needle or tube (catheter) was placed, seizures, trouble breathing, red urine, nausea or vomiting, other health concerns.     If your symptoms are severe, call [phone].  If your veins were used, keep the bandages on for 2-4 hours.  Avoid heavy lifting with your arms.  If bleeding occurs from these sites, apply firm pressure for 5-10 minutes.  Call your physician if bleeding continues.      The Apheresis/Blood Donor Center is open Monday-Friday 7:30 a.m. to 5 p.m.  The phone number is [phone].  A Transfusion Medicine physician can be reached after 5:00 p.m. weekdays and on weekends /Holidays by calling [phone], and asking for the physician on call.      Allogeneic Donor Collection:  Sometimes following the procedure, your blood platelet count may be low.  If you are told your platelet count is low, you need to avoid taking aspirin/aspirin containing products and avoid heavy physical activity and activities that may result in bruising or traumatic injury.  Related donors will receive a call from a Bone Marrow Transplant Coordinator who will tell you if you need to return for an additional collection.  Remember to follow a low-fat diet if you will need another collection.  To contact the BMT fellow or attending physician after 5 p.m. call [phone].     If your collection procedures are complete, the Coordinator may arrange to have a nurse on the Transplant station (5C) remove the angiocatheter (peripheral IV) tonight.     Instructions for indwelling peripheral line: The angiocatheter (peripheral IV) used to return the blood cells to you, may be use again tomorrow. If the peripheral IV needs to remain in place overnight, please follow the instructions below:  1. Keep the bandage in place.  2. Keep the dressing and catheter site  clean and dry.  3. Do not do any lifting using the arm where the angiocatheter (peripheral IV) is placed.  4. If it becomes dislodged, you may remove it and apply pressure until the bleeding has stopped. Then apply a band-aid to the site.  (We will give you a package with a gauze pad and band-aid).  5. If you have any questions or concerns, call [phone] between the hours of 7:30 a.m. to 5 p.m.  After 5 p.m., you can page the Apheresis RN staff on call at [phone].

## 2021-10-20 NOTE — PROGRESS NOTES
BMT/Cell Therapy Daily Progress Note   10/20/2021    Patient ID:  Roseline Wallace is a 26 year old female, here for G-CSF mobilization as a syngeneic domor.      INTERVAL  HISTORY     Roseline is collecting today, she is feeling well and has no focal complaints. No fevers, chills, N/V/D, dyspnea, headache, or any other complaints.    Review of Systems: 10 point ROS negative except as noted above.      PHYSICAL EXAM     Weight In/Out     Wt Readings from Last 3 Encounters:   10/20/21 69.6 kg (153 lb 7 oz)   10/14/21 70.2 kg (154 lb 11.2 oz)   10/14/21 70.2 kg (154 lb 11.2 oz)             KPS:  100  There were no vitals taken for this visit.     General: NAD   Eyes: : VICKY, sclera anicteric   Nose/Mouth/Throat: OP clear, buccal mucosa moist, no ulcerations   Lungs: No wheezing  Cardiovascular: No JVD  Abdominal/Rectal: non-distended  Lymphatics: no edema  Skin: no rashes or petechiae  Neuro: A&O   Musculoskeletal: muscle mass normal      LABS AND IMAGING: I have assessed all abnormal lab values for their clinical significance and any values considered clinically significant have been addressed in the assessment and plan.        Lab Results   Component Value Date    WBC 32.0 (H) 10/20/2021    ANEU 26.9 (H) 10/20/2021    HGB 13.5 10/20/2021    HCT 40.2 10/20/2021     10/20/2021     10/14/2021    POTASSIUM 3.7 10/14/2021    CHLORIDE 105 10/14/2021    CO2 27 10/14/2021     (H) 10/14/2021    BUN 8 10/14/2021    CR 0.92 10/14/2021    INR 1.06 10/14/2021       SYSTEMS-BASED ASSESSMENT AND PLAN       1. Healthy syngeneic donor    Starts apheresis today, cont G-CSF mobilization     Pending collection results this evening may return for second day collections    2.  ID COLLETTE testing in process as of today (delayed) however based on recipient's schedule this should not be a problem, confirmed with NC.    Known issues that I take into account for medical decisions, with salient changes to the plan considering  these complexities noted above.    Patient Active Problem List   Diagnosis     Stem cell donor       I spent 20 minutes in the care of this patient today, which included time necessary for preparation for the visit, obtaining history, ordering medications/tests/procedures as medically indicated, review of pertinent medical literature, counseling of the patient, communication of recommendations to the care team, and documentation time.    Rola Bolaños    Addendum: patient collected 6x10^6, with goal of 5x10^6. Collections completed. Patient notified, NC notified.  Rola

## 2021-10-20 NOTE — LETTER
10/20/2021         RE: Roseline Wallace  [id number] [address]  [place] [place] [address]        Dear Colleague,    Thank you for referring your patient, Roseline Wallace, to the [facility]OD AND MARR[facility] TRANSPL[facility]. Please see a copy of my visit note below.    BMT/Cell Therapy Daily Progress Note   10/20/2021    Patient ID:  Roseline Wallace is a 26 year old female, here for G-CSF mobilization as a syngeneic domor.      INTERVAL  HISTORY     Roseline is collecting today, she is feeling well and has no focal complaints. No fevers, chills, N/V/D, dyspnea, headache, or any other complaints.    Review of Systems: 10 point ROS negative except as noted above.      PHYSICAL EXAM     Weight In/Out     Wt Readings from Last 3 Encounters:   10/20/21 69.6 kg (153 lb 7 oz)   10/14/21 70.2 kg (154 lb 11.2 oz)   10/14/21 70.2 kg (154 lb 11.2 oz)             KPS:  100  There were no vitals taken for this visit.     General: NAD   Eyes: : VICKY, sclera anicteric   Nose/Mouth/Throat: OP clear, buccal mucosa moist, no ulcerations   Lungs: No wheezing  Cardiovascular: No JVD  Abdominal/Rectal: non-distended  Lymphatics: no edema  Skin: no rashes or petechiae  Neuro: A&O   Musculoskeletal: muscle mass normal      LABS AND IMAGING: I have assessed all abnormal lab values for their clinical significance and any values considered clinically significant have been addressed in the assessment and plan.        Lab Results   Component Value Date    WBC 32.0 (H) 10/20/2021    ANEU 26.9 (H) 10/20/2021    HGB 13.5 10/20/2021    HCT 40.2 10/20/2021     10/20/2021     10/14/2021    POTASSIUM 3.7 10/14/2021    CHLORIDE 105 10/14/2021    CO2 27 10/14/2021     (H) 10/14/2021    BUN 8 10/14/2021    CR 0.92 10/14/2021    INR 1.06 10/14/2021       SYSTEMS-BASED ASSESSMENT AND PLAN       1. Healthy syngeneic donor    Starts apheresis today, cont G-CSF mobilization     Pending collection results this evening may  return for second day collections    2.  ID COLLETTE testing in process as of today (delayed) however based on recipient's schedule this should not be a problem, confirmed with NC.    Known issues that I take into account for medical decisions, with salient changes to the plan considering these complexities noted above.    Patient Active Problem List   Diagnosis     Stem cell donor       I spent 20 minutes in the care of this patient today, which included time necessary for preparation for the visit, obtaining history, ordering medications/tests/procedures as medically indicated, review of pertinent medical literature, counseling of the patient, communication of recommendations to the care team, and documentation time.    Rola Bolaños    Addendum: patient collected 6x10^6, with goal of 5x10^6. Collections completed. Patient notified, NC notified.  Rola        Again, thank you for allowing me to participate in the care of your patient.        Sincerely,        BMT Advanced Practice Provider

## 2021-10-25 LAB
HBV DNA SERPL QL NAA+PROBE: NORMAL
HCV RNA SERPL QL NAA+PROBE: NORMAL
HIV1+2 RNA SERPL QL NAA+PROBE: NORMAL
WNV RNA SERPL DONR QL NAA+PROBE: NORMAL

## (undated) RX ORDER — CALCIUM GLUCONATE 94 MG/ML
INJECTION, SOLUTION INTRAVENOUS
Status: DISPENSED
Start: 2021-10-20